# Patient Record
Sex: MALE | Race: WHITE | NOT HISPANIC OR LATINO | Employment: FULL TIME | URBAN - NONMETROPOLITAN AREA
[De-identification: names, ages, dates, MRNs, and addresses within clinical notes are randomized per-mention and may not be internally consistent; named-entity substitution may affect disease eponyms.]

---

## 2017-09-20 DIAGNOSIS — I10 ESSENTIAL HYPERTENSION: ICD-10-CM

## 2017-09-21 RX ORDER — SPIRONOLACTONE 50 MG/1
TABLET, FILM COATED ORAL
Qty: 90 TAB | Refills: 0 | Status: SHIPPED | OUTPATIENT
Start: 2017-09-21 | End: 2017-12-19 | Stop reason: SDUPTHER

## 2017-09-21 RX ORDER — LISINOPRIL 20 MG/1
TABLET ORAL
Qty: 90 TAB | Refills: 0 | Status: SHIPPED | OUTPATIENT
Start: 2017-09-21 | End: 2017-12-19 | Stop reason: SDUPTHER

## 2017-12-19 DIAGNOSIS — I10 ESSENTIAL HYPERTENSION: ICD-10-CM

## 2017-12-19 RX ORDER — SPIRONOLACTONE 50 MG/1
TABLET, FILM COATED ORAL
Qty: 90 TAB | Refills: 3 | Status: SHIPPED | OUTPATIENT
Start: 2017-12-19 | End: 2018-07-16 | Stop reason: SDUPTHER

## 2017-12-19 RX ORDER — LISINOPRIL 20 MG/1
TABLET ORAL
Qty: 90 TAB | Refills: 3 | Status: SHIPPED | OUTPATIENT
Start: 2017-12-19 | End: 2018-07-16 | Stop reason: SDUPTHER

## 2018-07-09 ENCOUNTER — HOSPITAL ENCOUNTER (OUTPATIENT)
Dept: LAB | Facility: MEDICAL CENTER | Age: 63
End: 2018-07-09
Attending: FAMILY MEDICINE
Payer: COMMERCIAL

## 2018-07-09 DIAGNOSIS — E55.9 VITAMIN D DEFICIENCY DISEASE: ICD-10-CM

## 2018-07-09 DIAGNOSIS — E78.2 MIXED HYPERLIPIDEMIA: ICD-10-CM

## 2018-07-09 LAB
25(OH)D3 SERPL-MCNC: 55 NG/ML (ref 30–100)
BASOPHILS # BLD AUTO: 0.5 % (ref 0–1.8)
BASOPHILS # BLD: 0.05 K/UL (ref 0–0.12)
EOSINOPHIL # BLD AUTO: 0.26 K/UL (ref 0–0.51)
EOSINOPHIL NFR BLD: 2.7 % (ref 0–6.9)
ERYTHROCYTE [DISTWIDTH] IN BLOOD BY AUTOMATED COUNT: 43.9 FL (ref 35.9–50)
HCT VFR BLD AUTO: 50.1 % (ref 42–52)
HGB BLD-MCNC: 16.7 G/DL (ref 14–18)
IMM GRANULOCYTES # BLD AUTO: 0.04 K/UL (ref 0–0.11)
IMM GRANULOCYTES NFR BLD AUTO: 0.4 % (ref 0–0.9)
LYMPHOCYTES # BLD AUTO: 3.01 K/UL (ref 1–4.8)
LYMPHOCYTES NFR BLD: 31.4 % (ref 22–41)
MCH RBC QN AUTO: 32.5 PG (ref 27–33)
MCHC RBC AUTO-ENTMCNC: 33.3 G/DL (ref 33.7–35.3)
MCV RBC AUTO: 97.5 FL (ref 81.4–97.8)
MONOCYTES # BLD AUTO: 0.75 K/UL (ref 0–0.85)
MONOCYTES NFR BLD AUTO: 7.8 % (ref 0–13.4)
NEUTROPHILS # BLD AUTO: 5.47 K/UL (ref 1.82–7.42)
NEUTROPHILS NFR BLD: 57.2 % (ref 44–72)
NRBC # BLD AUTO: 0 K/UL
NRBC BLD-RTO: 0 /100 WBC
PLATELET # BLD AUTO: 294 K/UL (ref 164–446)
PMV BLD AUTO: 11.3 FL (ref 9–12.9)
RBC # BLD AUTO: 5.14 M/UL (ref 4.7–6.1)
WBC # BLD AUTO: 9.6 K/UL (ref 4.8–10.8)

## 2018-07-09 PROCEDURE — 82306 VITAMIN D 25 HYDROXY: CPT

## 2018-07-09 PROCEDURE — 36415 COLL VENOUS BLD VENIPUNCTURE: CPT

## 2018-07-09 PROCEDURE — 80061 LIPID PANEL: CPT

## 2018-07-09 PROCEDURE — 85025 COMPLETE CBC W/AUTO DIFF WBC: CPT

## 2018-07-09 PROCEDURE — 80053 COMPREHEN METABOLIC PANEL: CPT

## 2018-07-10 LAB
ALBUMIN SERPL BCP-MCNC: 4.1 G/DL (ref 3.2–4.9)
ALBUMIN/GLOB SERPL: 1.7 G/DL
ALP SERPL-CCNC: 67 U/L (ref 30–99)
ALT SERPL-CCNC: 21 U/L (ref 2–50)
ANION GAP SERPL CALC-SCNC: 6 MMOL/L (ref 0–11.9)
AST SERPL-CCNC: 18 U/L (ref 12–45)
BILIRUB SERPL-MCNC: 0.5 MG/DL (ref 0.1–1.5)
BUN SERPL-MCNC: 13 MG/DL (ref 8–22)
CALCIUM SERPL-MCNC: 9.5 MG/DL (ref 8.5–10.5)
CHLORIDE SERPL-SCNC: 107 MMOL/L (ref 96–112)
CHOLEST SERPL-MCNC: 203 MG/DL (ref 100–199)
CO2 SERPL-SCNC: 27 MMOL/L (ref 20–33)
CREAT SERPL-MCNC: 1.06 MG/DL (ref 0.5–1.4)
GLOBULIN SER CALC-MCNC: 2.4 G/DL (ref 1.9–3.5)
GLUCOSE SERPL-MCNC: 95 MG/DL (ref 65–99)
HDLC SERPL-MCNC: 50 MG/DL
LDLC SERPL CALC-MCNC: 102 MG/DL
POTASSIUM SERPL-SCNC: 4.4 MMOL/L (ref 3.6–5.5)
PROT SERPL-MCNC: 6.5 G/DL (ref 6–8.2)
SODIUM SERPL-SCNC: 140 MMOL/L (ref 135–145)
TRIGL SERPL-MCNC: 254 MG/DL (ref 0–149)

## 2018-07-16 ENCOUNTER — OFFICE VISIT (OUTPATIENT)
Dept: MEDICAL GROUP | Facility: MEDICAL CENTER | Age: 63
End: 2018-07-16
Payer: COMMERCIAL

## 2018-07-16 VITALS
WEIGHT: 195 LBS | HEIGHT: 70 IN | OXYGEN SATURATION: 92 % | HEART RATE: 91 BPM | TEMPERATURE: 98.3 F | BODY MASS INDEX: 27.92 KG/M2 | DIASTOLIC BLOOD PRESSURE: 82 MMHG | SYSTOLIC BLOOD PRESSURE: 118 MMHG

## 2018-07-16 DIAGNOSIS — M15.9 PRIMARY OSTEOARTHRITIS INVOLVING MULTIPLE JOINTS: ICD-10-CM

## 2018-07-16 DIAGNOSIS — F17.200 TOBACCO USE DISORDER: ICD-10-CM

## 2018-07-16 DIAGNOSIS — J30.1 SEASONAL ALLERGIC RHINITIS DUE TO POLLEN: ICD-10-CM

## 2018-07-16 DIAGNOSIS — L50.2 URTICARIA DUE TO COLD: ICD-10-CM

## 2018-07-16 DIAGNOSIS — E78.2 MIXED HYPERLIPIDEMIA: ICD-10-CM

## 2018-07-16 DIAGNOSIS — E55.9 VITAMIN D DEFICIENCY DISEASE: ICD-10-CM

## 2018-07-16 DIAGNOSIS — C67.4 MALIGNANT NEOPLASM OF POSTERIOR WALL OF URINARY BLADDER (HCC): ICD-10-CM

## 2018-07-16 DIAGNOSIS — I10 ESSENTIAL HYPERTENSION: ICD-10-CM

## 2018-07-16 PROCEDURE — 99214 OFFICE O/P EST MOD 30 MIN: CPT | Performed by: FAMILY MEDICINE

## 2018-07-16 RX ORDER — CETIRIZINE HYDROCHLORIDE 10 MG/1
10 TABLET ORAL DAILY
COMMUNITY

## 2018-07-16 RX ORDER — LISINOPRIL 20 MG/1
20 TABLET ORAL
Qty: 90 TAB | Refills: 3 | Status: SHIPPED | OUTPATIENT
Start: 2018-07-16 | End: 2019-11-18 | Stop reason: SDUPTHER

## 2018-07-16 RX ORDER — SPIRONOLACTONE 50 MG/1
50 TABLET, FILM COATED ORAL
Qty: 90 TAB | Refills: 3 | Status: SHIPPED | OUTPATIENT
Start: 2018-07-16 | End: 2019-11-18 | Stop reason: SDUPTHER

## 2018-07-16 RX ORDER — ALBUTEROL SULFATE 90 UG/1
2 AEROSOL, METERED RESPIRATORY (INHALATION) EVERY 6 HOURS PRN
Qty: 3 INHALER | Refills: 3 | Status: SHIPPED | OUTPATIENT
Start: 2018-07-16 | End: 2018-07-18 | Stop reason: CLARIF

## 2018-07-16 ASSESSMENT — PATIENT HEALTH QUESTIONNAIRE - PHQ9: CLINICAL INTERPRETATION OF PHQ2 SCORE: 0

## 2018-07-16 NOTE — ASSESSMENT & PLAN NOTE
This is a chronic health problem for this patient that he is trying to quit multiple times.  He works as a  and finds that he is unable to quit currently.  He will have to do this for 3 more years.  Then I will hit him hard to get him to quit.

## 2018-07-16 NOTE — PROGRESS NOTES
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      HISTORY OF PRESENT ILLNESS: Patient is a 62 y.o. male established patient who presents today to follow-up on chronic health problems as outlined below.  Patient informs me that he has 3 years and he will be able to retire from .  He is moving from nights to days in the coming 3 weeks.  This will work better for his overall health issues.    Health Maintenance: Patient refuses lung cancer risk screening and will not do fit testing until colonoscopy is due.    Essential hypertension  This is a chronic health problem for this patient that is adequately controlled with current medications.  His blood pressures down to 118/82.  He is maintaining his weight and doing well.    Primary osteoarthritis involving multiple joints  This is a chronic health problem for this patient which is not perfectly controlled.  He is using diclofenac sodium on a daily basis to try and keep him mobile but this does not completely control his pain.  He works as a  and has to be seated for extended periods of time.  When he goes to get out of the tract he is very stiff and it will take up to 40 minutes before he can move normally.    Tobacco use disorder  This is a chronic health problem for this patient that he is trying to quit multiple times.  He works as a  and finds that he is unable to quit currently.  He will have to do this for 3 more years.  Then I will hit him hard to get him to quit.    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  He had a much lesser case this last year.   We will keep an eye out for this in the future.    Mixed hyperlipidemia  This is a chronic health problem for this patient that has shown some progression.  His total cholesterol is 203, triglycerides went up to 254 and we want those to be under 150, HDL good at 50 LDL okay at 102 but would like to see it under 100.  Patient and his wife have both recently gone to some lifestyle changes and change to how they were eating.  He does drink alcohol typically 5-6 beers when he gets home.  He is now cut that down to 2-3 and is going to try and stay with just 2 beers.    Malignant neoplasm of posterior wall of urinary bladder  This is a chronic health problem for this patient for which he follows with Dr. Tobin Arzate.  He so far is in remission.  He has 1 more additional evaluation which will be coming up in September 2018.    Vitamin D deficiency disease  This is a chronic health problem for which he is on 10,000 units daily he is doing well.  We will have him continue that level of dosage because his vitamin D level is excellent at 55.      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    Current Outpatient Prescriptions   Medication Sig Dispense Refill   • cetirizine (ZYRTEC) 10 MG Tab Take 10 mg by mouth every day.     • diclofenac CR (VOLTAREN-XR) 100 MG TABLET SR 24 HR tablet Take 1 Tab by mouth every day. 90 Tab 3   • lisinopril (PRINIVIL) 20 MG Tab Take 1 Tab by mouth every day. 90 Tab 3   •  spironolactone (ALDACTONE) 50 MG Tab Take 1 Tab by mouth every day. 90 Tab 3   • albuterol (VENTOLIN HFA) 108 (90 Base) MCG/ACT Aero Soln inhalation aerosol Inhale 2 Puffs by mouth every 6 hours as needed. 3 Inhaler 3   • Cholecalciferol (VITAMIN D3) 5000 UNIT CAPS Take 10,000 Units by mouth every day. 60 Cap 11   • pseudoephedrine SR (SUDAFED 12 HOUR) 120 MG TB12 Take 120 mg by mouth every day.     • triamcinolone acetonide (KENALOG) 0.1 % OINT Apply to affected area bid as needed 90 g 3     No current facility-administered medications for this visit.        Social History   Substance Use Topics   • Smoking status: Current Every Day Smoker     Packs/day: 2.00     Years: 40.00     Types: Cigarettes   • Smokeless tobacco: Never Used   • Alcohol use 10.5 oz/week     21 Cans of beer per week     Social History     Social History Narrative   • No narrative on file       Family History   Problem Relation Age of Onset   • Cancer Mother      breast   • Cancer Father      lung       Review of Systems:      - Constitutional: Negative for fever, chills, unexpected weight change, and fatigue/generalized weakness.     - HEENT: Negative for headaches, vision changes, hearing changes, ear pain, ear discharge, rhinorrhea, sinus congestion, sore throat, and neck pain.      - Respiratory: Negative for cough, sputum production, chest congestion, dyspnea, wheezing, and crackles.      - Cardiovascular: Negative for chest pain, palpitations, orthopnea, and bilateral lower extremity edema.     - Gastrointestinal: Negative for heartburn, nausea, vomiting, abdominal pain, hematochezia, melena, diarrhea, constipation, and greasy/foul-smelling stools.     - Genitourinary: Negative for dysuria, polyuria, hematuria, pyuria, urinary urgency, and urinary incontinence.    - Musculoskeletal: Patient lives with joint pain in nearly every joint but particularly bad and low back hips knees and hands bilaterally.  Otherwise denies myalgias, back pain,  "and joint pain.     - Skin: Negative for rash, itching, cyanotic skin color change.     - Neurological: Negative for dizziness, tingling, tremors, focal sensory deficit, focal weakness and headaches.     - Endo/Heme/Allergies: Does not bruise/bleed easily.     - Psychiatric/Behavioral: Negative for depression, suicidal/homicidal ideation and memory loss.          - NOTE: All other systems reviewed and are negative, except as in HPI.    Exam:    Blood pressure 118/82, pulse 91, temperature 36.8 °C (98.3 °F), height 1.778 m (5' 10\"), weight 88.5 kg (195 lb), SpO2 92 %. Body mass index is 27.98 kg/m².    General:  Well nourished, well developed male in NAD  LABS: 7/9/18: Results reviewed and discussed with the patient, questions answered.    Patient was seen for 25 minutes face to face of which, 20 minutes was spent counseling regarding the above mentioned problems.  Assessment/Plan:  1. Essential hypertension  Controlled, continue with current meds and lifestyle.    - lisinopril (PRINIVIL) 20 MG Tab; Take 1 Tab by mouth every day.  Dispense: 90 Tab; Refill: 3  - spironolactone (ALDACTONE) 50 MG Tab; Take 1 Tab by mouth every day.  Dispense: 90 Tab; Refill: 3    2. Primary osteoarthritis involving multiple joints  Controlled, continue with current meds and lifestyle.      3. Tobacco use disorder  Patient cautioned to quit smoking.  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    4. 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      5. Mixed hyperlipidemia  Uncontrolled because patient's triglycerides have gone up.  I think it is due to alcohol use.  He is going to cut down to 2 beers a day and we will plan to see him back in 3 months to be certain that his triglycerides have come back down.  - LIPID PROFILE; Future  - COMP METABOLIC PANEL; Future    6. Malignant neoplasm of posterior wall of urinary bladder (HCC)  Stable, patient has 1 more follow-up with his urologist " before he will be considered a cure.    7. Vitamin D deficiency disease  Controlled, continue with current meds and lifestyle.      8. Seasonal allergic rhinitis due to pollen  Patient utilizes Ventolin when his inhaler is needed because of his allergies getting out of control.  He also is helping some of the shortness of breath he gets from smoking.  - albuterol (VENTOLIN HFA) 108 (90 Base) MCG/ACT Aero Soln inhalation aerosol; Inhale 2 Puffs by mouth every 6 hours as needed.  Dispense: 3 Inhaler; Refill: 3

## 2018-07-16 NOTE — ASSESSMENT & PLAN NOTE
This is a chronic health problem for this patient for which he follows with Dr. Tobin Arzate.  He so far is in remission.  He has 1 more additional evaluation which will be coming up in September 2018.

## 2018-07-16 NOTE — ASSESSMENT & PLAN NOTE
This is a chronic health problem for this patient that he is able to control with use of the cream when the rash shows up.  He had a much lesser case this last year.  We will keep an eye out for this in the future.

## 2018-07-16 NOTE — ASSESSMENT & PLAN NOTE
This is a chronic health problem for this patient that has shown some progression.  His total cholesterol is 203, triglycerides went up to 254 and we want those to be under 150, HDL good at 50 LDL okay at 102 but would like to see it under 100.  Patient and his wife have both recently gone to some lifestyle changes and change to how they were eating.  He does drink alcohol typically 5-6 beers when he gets home.  He is now cut that down to 2-3 and is going to try and stay with just 2 beers.

## 2018-07-16 NOTE — ASSESSMENT & PLAN NOTE
This is a chronic health problem for this patient that is adequately controlled with current medications.  His blood pressures down to 118/82.  He is maintaining his weight and doing well.

## 2018-07-18 RX ORDER — ALBUTEROL SULFATE 90 UG/1
2 AEROSOL, METERED RESPIRATORY (INHALATION) EVERY 6 HOURS PRN
Qty: 3 INHALER | Refills: 3 | Status: SHIPPED | OUTPATIENT
Start: 2018-07-18 | End: 2019-11-18 | Stop reason: SDUPTHER

## 2018-09-08 ENCOUNTER — HOSPITAL ENCOUNTER (OUTPATIENT)
Dept: LAB | Facility: MEDICAL CENTER | Age: 63
End: 2018-09-08
Attending: UROLOGY
Payer: COMMERCIAL

## 2018-09-08 LAB — PSA SERPL-MCNC: 0.41 NG/ML (ref 0–4)

## 2018-09-08 PROCEDURE — 36415 COLL VENOUS BLD VENIPUNCTURE: CPT

## 2018-09-08 PROCEDURE — 84153 ASSAY OF PSA TOTAL: CPT

## 2019-09-09 ENCOUNTER — HOSPITAL ENCOUNTER (OUTPATIENT)
Dept: LAB | Facility: MEDICAL CENTER | Age: 64
End: 2019-09-09
Attending: UROLOGY
Payer: COMMERCIAL

## 2019-09-09 LAB — PSA SERPL-MCNC: 0.53 NG/ML (ref 0–4)

## 2019-09-09 PROCEDURE — 84153 ASSAY OF PSA TOTAL: CPT

## 2019-09-09 PROCEDURE — 36415 COLL VENOUS BLD VENIPUNCTURE: CPT

## 2019-11-15 DIAGNOSIS — E78.2 MIXED HYPERLIPIDEMIA: ICD-10-CM

## 2019-11-15 DIAGNOSIS — E55.9 VITAMIN D DEFICIENCY DISEASE: ICD-10-CM

## 2019-11-16 ENCOUNTER — HOSPITAL ENCOUNTER (OUTPATIENT)
Dept: LAB | Facility: MEDICAL CENTER | Age: 64
End: 2019-11-16
Attending: FAMILY MEDICINE
Payer: MEDICARE

## 2019-11-16 DIAGNOSIS — E55.9 VITAMIN D DEFICIENCY DISEASE: ICD-10-CM

## 2019-11-16 DIAGNOSIS — E78.2 MIXED HYPERLIPIDEMIA: ICD-10-CM

## 2019-11-16 LAB
25(OH)D3 SERPL-MCNC: 51 NG/ML (ref 30–100)
ALBUMIN SERPL BCP-MCNC: 4.2 G/DL (ref 3.2–4.9)
ALBUMIN/GLOB SERPL: 1.8 G/DL
ALP SERPL-CCNC: 63 U/L (ref 30–99)
ALT SERPL-CCNC: 22 U/L (ref 2–50)
ANION GAP SERPL CALC-SCNC: 8 MMOL/L (ref 0–11.9)
AST SERPL-CCNC: 18 U/L (ref 12–45)
BASOPHILS # BLD AUTO: 0.6 % (ref 0–1.8)
BASOPHILS # BLD: 0.06 K/UL (ref 0–0.12)
BILIRUB SERPL-MCNC: 0.7 MG/DL (ref 0.1–1.5)
BUN SERPL-MCNC: 15 MG/DL (ref 8–22)
CALCIUM SERPL-MCNC: 9.7 MG/DL (ref 8.5–10.5)
CHLORIDE SERPL-SCNC: 104 MMOL/L (ref 96–112)
CHOLEST SERPL-MCNC: 221 MG/DL (ref 100–199)
CO2 SERPL-SCNC: 27 MMOL/L (ref 20–33)
CREAT SERPL-MCNC: 1.21 MG/DL (ref 0.5–1.4)
EOSINOPHIL # BLD AUTO: 0.54 K/UL (ref 0–0.51)
EOSINOPHIL NFR BLD: 5.2 % (ref 0–6.9)
ERYTHROCYTE [DISTWIDTH] IN BLOOD BY AUTOMATED COUNT: 46.1 FL (ref 35.9–50)
FASTING STATUS PATIENT QL REPORTED: NORMAL
GLOBULIN SER CALC-MCNC: 2.3 G/DL (ref 1.9–3.5)
GLUCOSE SERPL-MCNC: 93 MG/DL (ref 65–99)
HCT VFR BLD AUTO: 54.7 % (ref 42–52)
HDLC SERPL-MCNC: 46 MG/DL
HGB BLD-MCNC: 18.2 G/DL (ref 14–18)
IMM GRANULOCYTES # BLD AUTO: 0.04 K/UL (ref 0–0.11)
IMM GRANULOCYTES NFR BLD AUTO: 0.4 % (ref 0–0.9)
LDLC SERPL CALC-MCNC: 127 MG/DL
LYMPHOCYTES # BLD AUTO: 2.69 K/UL (ref 1–4.8)
LYMPHOCYTES NFR BLD: 26.1 % (ref 22–41)
MCH RBC QN AUTO: 33.3 PG (ref 27–33)
MCHC RBC AUTO-ENTMCNC: 33.3 G/DL (ref 33.7–35.3)
MCV RBC AUTO: 100.2 FL (ref 81.4–97.8)
MONOCYTES # BLD AUTO: 0.96 K/UL (ref 0–0.85)
MONOCYTES NFR BLD AUTO: 9.3 % (ref 0–13.4)
NEUTROPHILS # BLD AUTO: 6.02 K/UL (ref 1.82–7.42)
NEUTROPHILS NFR BLD: 58.4 % (ref 44–72)
NRBC # BLD AUTO: 0 K/UL
NRBC BLD-RTO: 0 /100 WBC
PLATELET # BLD AUTO: 262 K/UL (ref 164–446)
PMV BLD AUTO: 11.1 FL (ref 9–12.9)
POTASSIUM SERPL-SCNC: 4.7 MMOL/L (ref 3.6–5.5)
PROT SERPL-MCNC: 6.5 G/DL (ref 6–8.2)
RBC # BLD AUTO: 5.46 M/UL (ref 4.7–6.1)
SODIUM SERPL-SCNC: 139 MMOL/L (ref 135–145)
TRIGL SERPL-MCNC: 240 MG/DL (ref 0–149)
WBC # BLD AUTO: 10.3 K/UL (ref 4.8–10.8)

## 2019-11-16 PROCEDURE — 36415 COLL VENOUS BLD VENIPUNCTURE: CPT

## 2019-11-16 PROCEDURE — 80061 LIPID PANEL: CPT

## 2019-11-16 PROCEDURE — 80053 COMPREHEN METABOLIC PANEL: CPT

## 2019-11-16 PROCEDURE — 85025 COMPLETE CBC W/AUTO DIFF WBC: CPT

## 2019-11-16 PROCEDURE — 82306 VITAMIN D 25 HYDROXY: CPT

## 2019-11-18 ENCOUNTER — OFFICE VISIT (OUTPATIENT)
Dept: MEDICAL GROUP | Facility: MEDICAL CENTER | Age: 64
End: 2019-11-18
Payer: COMMERCIAL

## 2019-11-18 VITALS
TEMPERATURE: 98.2 F | BODY MASS INDEX: 28.56 KG/M2 | RESPIRATION RATE: 12 BRPM | HEIGHT: 71 IN | OXYGEN SATURATION: 93 % | WEIGHT: 204 LBS | DIASTOLIC BLOOD PRESSURE: 72 MMHG | HEART RATE: 82 BPM | SYSTOLIC BLOOD PRESSURE: 116 MMHG

## 2019-11-18 DIAGNOSIS — C67.4 MALIGNANT NEOPLASM OF POSTERIOR WALL OF URINARY BLADDER (HCC): ICD-10-CM

## 2019-11-18 DIAGNOSIS — E55.9 VITAMIN D DEFICIENCY DISEASE: ICD-10-CM

## 2019-11-18 DIAGNOSIS — Z11.59 NEED FOR HEPATITIS C SCREENING TEST: ICD-10-CM

## 2019-11-18 DIAGNOSIS — E78.2 MIXED HYPERLIPIDEMIA: ICD-10-CM

## 2019-11-18 DIAGNOSIS — I10 ESSENTIAL HYPERTENSION: ICD-10-CM

## 2019-11-18 DIAGNOSIS — L20.84 INTRINSIC ECZEMA: ICD-10-CM

## 2019-11-18 DIAGNOSIS — Z12.12 SCREENING FOR COLORECTAL CANCER: ICD-10-CM

## 2019-11-18 DIAGNOSIS — D75.1 POLYCYTHEMIA SECONDARY TO SMOKING: ICD-10-CM

## 2019-11-18 DIAGNOSIS — Z12.11 SCREENING FOR COLORECTAL CANCER: ICD-10-CM

## 2019-11-18 DIAGNOSIS — F17.200 TOBACCO USE DISORDER: ICD-10-CM

## 2019-11-18 PROCEDURE — 99214 OFFICE O/P EST MOD 30 MIN: CPT | Performed by: FAMILY MEDICINE

## 2019-11-18 RX ORDER — ALBUTEROL SULFATE 90 UG/1
2 AEROSOL, METERED RESPIRATORY (INHALATION) EVERY 6 HOURS PRN
Qty: 3 INHALER | Refills: 3 | Status: SHIPPED | OUTPATIENT
Start: 2019-11-18

## 2019-11-18 RX ORDER — SPIRONOLACTONE 50 MG/1
50 TABLET, FILM COATED ORAL
Qty: 90 TAB | Refills: 3 | Status: SHIPPED | OUTPATIENT
Start: 2019-11-18

## 2019-11-18 RX ORDER — LISINOPRIL 20 MG/1
20 TABLET ORAL
Qty: 90 TAB | Refills: 3 | Status: SHIPPED | OUTPATIENT
Start: 2019-11-18 | End: 2020-08-26 | Stop reason: SDUPTHER

## 2019-11-18 RX ORDER — TRIAMCINOLONE ACETONIDE 1 MG/G
OINTMENT TOPICAL
Qty: 90 G | Refills: 3 | Status: SHIPPED | OUTPATIENT
Start: 2019-11-18 | End: 2020-04-06

## 2019-11-18 ASSESSMENT — PATIENT HEALTH QUESTIONNAIRE - PHQ9: CLINICAL INTERPRETATION OF PHQ2 SCORE: 0

## 2019-11-18 NOTE — ASSESSMENT & PLAN NOTE
Pt is now 4 years without cancer return.  He continues with annual evaluations and will be done in the coming year.

## 2019-11-18 NOTE — ASSESSMENT & PLAN NOTE
This is a chronic this is a chronic health problem in a gentleman who smokes.  He tells me he will never stop smoking.  His hemoglobin has gone up in July of last year it was 16.7 now at 18.2.  He is a  and drives long haul.  I am going to have him start taking a baby aspirin 81 mg daily to try and prevent blood clots.

## 2019-11-18 NOTE — ASSESSMENT & PLAN NOTE
This chronic health problem for this patient well-controlled with current meds blood pressure is excellent 116/72.  Weight is stable.

## 2019-11-18 NOTE — ASSESSMENT & PLAN NOTE
This is continuing to be a problem.  He is not interested in stopping at this time or in the future.

## 2019-11-18 NOTE — ASSESSMENT & PLAN NOTE
This is a chronic health problem that is uncontrolled with current medications and lifestyle measures.  He refuses to take a statin.  His total cholesterol is 221, triglycerides of come down to 240 they were 254 previously.  His HDL is decent at 46 LDL only slightly high at 127.  I did look to see it under 100.  Patient will continue with lifestyle management.

## 2019-11-18 NOTE — PROGRESS NOTES
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    HISTORY OF PRESENT ILLNESS: Patient is a 63 y.o. male established patient who presents today to talk about his chronic health problems listed below.  Patient also has a new problem which is the fact that he is developed polycythemia.  Patient is a smoker and has no desire to quit smoking.  We may need to start doing therapeutic phlebotomies on him.  We will wait and recheck in 3 months to see whether or not he goes much higher on his hemoglobin.  Patient also informs me that he is now using multiple herbal products to try and help his chronic health problems and feels he is doing fairly well.  He refuses all vaccines secondary to mercury.      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  He tells me he will never stop smoking.  His hemoglobin has gone up in July of last year it was 16.7 now at 18.2.  He is a  and drives long haul.  I am going to have him start taking a baby aspirin 81 mg daily to try and prevent blood clots.    Essential hypertension  This chronic health problem for this patient well-controlled with current meds blood pressure is excellent 116/72.  Weight is stable.    Mixed hyperlipidemia  This is a chronic health problem that is uncontrolled with current medications and lifestyle measures.  He refuses to take a statin.  His total cholesterol is 221, triglycerides of come down to 240 they were 254 previously.  His HDL is decent at 46 LDL only slightly high at 127.  I did look to see it under 100.  Patient will continue with lifestyle management.    Tobacco use disorder  This is continuing  to be a problem.  He is not interested in stopping at this time or in the future.    Vitamin D deficiency disease  This is a chronic health problem that is well controlled with current medications and lifestyle measures.  His Vitamin D is excellent at 55.    Malignant neoplasm of posterior wall of urinary bladder  Pt is now 4 years without cancer return.  He continues with annual evaluations and will be done in the coming year.      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    Current Outpatient Medications   Medication Sig Dispense Refill   • albuterol 108 (90 Base) MCG/ACT Aero Soln inhalation aerosol Inhale 2 Puffs by mouth every 6 hours as needed for Shortness of Breath. 3 Inhaler 3   • lisinopril (PRINIVIL) 20 MG Tab Take 1 Tab by mouth every day. 90 Tab 3   • spironolactone (ALDACTONE) 50 MG Tab Take 1 Tab by mouth every day. 90 Tab 3   • triamcinolone acetonide (KENALOG) 0.1 % Ointment Apply to affected area bid as needed 90 g 3   • cetirizine (ZYRTEC) 10 MG Tab Take 10 mg by mouth every day.     • diclofenac CR (VOLTAREN-XR) 100 MG TABLET SR 24 HR tablet Take 1 Tab by mouth every day. 90 Tab 3   • pseudoephedrine SR (SUDAFED 12 HOUR) 120 MG TB12 Take 120 mg by mouth every day.     • Cholecalciferol (VITAMIN D3) 5000 UNIT CAPS Take 10,000 Units by mouth every day. 60 Cap 11     No current facility-administered medications for this  visit.        Social History     Tobacco Use   • Smoking status: Current Every Day Smoker     Packs/day: 2.00     Years: 40.00     Pack years: 80.00     Types: Cigarettes   • Smokeless tobacco: Never Used   Substance Use Topics   • Alcohol use: Yes     Alcohol/week: 10.5 oz     Types: 21 Cans of beer per week   • Drug use: No     Social History     Patient does not qualify to have social determinant information on file (likely too young).   Social History Narrative   • Not on file       Family History   Problem Relation Age of Onset   • Cancer Mother         breast   • Cancer Father         lung        ROS:     - Constitutional:  Negative for fever, chills, unexpected weight change, and fatigue/generalized weakness.    - HEENT:  Negative for headaches, vision changes, hearing changes, ear pain, ear discharge, rhinorrhea, sinus congestion, sore throat, and neck pain.      - Respiratory: Negative for cough, sputum production, chest congestion, dyspnea, wheezing, and crackles.      - Cardiovascular: Negative for chest pain, palpitations, orthopnea, and bilateral lower extremity edema.     - Gastrointestinal: Negative for heartburn, nausea, vomiting, abdominal pain, hematochezia, melena, diarrhea, constipation, and greasy/foul-smelling stools.     - Genitourinary: Negative for dysuria, polyuria, hematuria, pyuria, urinary urgency, and urinary incontinence.     - Musculoskeletal: Patient has generalized joint pain but he can keep it under fairly good control utilizing turmeric.     - Skin: Negative for rash, itching, cyanotic skin color change.     - Neurological: Negative for dizziness, tingling, tremors, focal sensory deficit, focal weakness and headaches.     - Endo/Heme/Allergies: Does not bruise/bleed easily.     - Psychiatric/Behavioral: Negative for depression, suicidal/homicidal ideation and memory loss.          - NOTE: All other systems reviewed and are negative, except as in HPI.      Exam:    /72 (BP  "Location: Left arm, Patient Position: Sitting, BP Cuff Size: Adult)   Pulse 82   Temp 36.8 °C (98.2 °F) (Temporal)   Resp 12   Ht 1.803 m (5' 11\")   Wt 92.5 kg (204 lb)   SpO2 93%  Body mass index is 28.45 kg/m².    General:  Well nourished, well developed male in NAD  Head is grossly normal.  Neck: Supple without JVD or bruit. Thyroid is not enlarged.  Pulmonary: Clear to ausculation and percussion.  Normal effort. No rales, ronchi, or wheezing.  Cardiovascular: Regular rate and rhythm without murmur. Carotid and radial pulses are intact and equal bilaterally.  Extremities: no clubbing, cyanosis, or edema.  LABS: 11/16/2019: Results reviewed and discussed with the patient, questions answered.    Please note that this dictation was created using voice recognition software. I have made every reasonable attempt to correct obvious errors, but I expect that there are errors of grammar and possibly content that I did not discover before finalizing the note.    Assessment/Plan:  1. Need for hepatitis C screening test  Patient will do hepatitis C screening  - HEP C VIRUS ANTIBODY; Future    2. Screening for colorectal cancer  Patient will do FIT testing  - Occult Blood Feces Immunoassay (FIT); Future    3. 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  He is going to recheck in 3 months because he absolutely refuses to quit smoking.    4. Essential hypertension  Controlled, continue with current meds and lifestyle.    - lisinopril (PRINIVIL) 20 MG Tab; Take 1 Tab by mouth every day.  Dispense: 90 Tab; Refill: 3  - spironolactone (ALDACTONE) 50 MG Tab; Take 1 Tab by mouth every day.  Dispense: 90 Tab; Refill: 3    5. Mixed hyperlipidemia  Uncontrolled and patient refuses statin.    6. Tobacco use disorder  Refuses to quit smoking he realizes that shortening his life  7. Vitamin D deficiency disease  Well-controlled with current dose    8. Malignant neoplasm of " posterior wall of urinary bladder (HCC)  Stable, patient's 4 years cancer free continuing with screenings with urology.  We will plan to recheck this in 1 year.    9. Intrinsic eczema  Controlled, continue with current meds and lifestyle.    - triamcinolone acetonide (KENALOG) 0.1 % Ointment; Apply to affected area bid as needed  Dispense: 90 g; Refill: 3

## 2019-11-18 NOTE — ASSESSMENT & PLAN NOTE
This is a chronic health problem that is well controlled with current medications and lifestyle measures.  His Vitamin D is excellent at 55.

## 2020-04-06 DIAGNOSIS — L20.84 INTRINSIC ECZEMA: ICD-10-CM

## 2020-04-06 RX ORDER — TRIAMCINOLONE ACETONIDE 1 MG/G
OINTMENT TOPICAL
Qty: 90 G | Refills: 3 | Status: SHIPPED | OUTPATIENT
Start: 2020-04-06

## 2020-05-18 ENCOUNTER — OFFICE VISIT (OUTPATIENT)
Dept: MEDICAL GROUP | Facility: PHYSICIAN GROUP | Age: 65
End: 2020-05-18
Payer: COMMERCIAL

## 2020-05-18 VITALS
DIASTOLIC BLOOD PRESSURE: 78 MMHG | HEIGHT: 71 IN | TEMPERATURE: 97.6 F | HEART RATE: 88 BPM | BODY MASS INDEX: 27.72 KG/M2 | SYSTOLIC BLOOD PRESSURE: 134 MMHG | OXYGEN SATURATION: 96 % | WEIGHT: 198 LBS | RESPIRATION RATE: 18 BRPM

## 2020-05-18 DIAGNOSIS — R19.4 BOWEL HABIT CHANGES: ICD-10-CM

## 2020-05-18 PROCEDURE — 99214 OFFICE O/P EST MOD 30 MIN: CPT | Performed by: NURSE PRACTITIONER

## 2020-05-18 RX ORDER — MULTIVIT WITH MINERALS/LUTEIN
TABLET ORAL
COMMUNITY

## 2020-05-18 SDOH — HEALTH STABILITY: MENTAL HEALTH: HOW OFTEN DO YOU HAVE A DRINK CONTAINING ALCOHOL?: 4 OR MORE TIMES A WEEK

## 2020-05-18 ASSESSMENT — FIBROSIS 4 INDEX: FIB4 SCORE: 0.95

## 2020-05-18 ASSESSMENT — PATIENT HEALTH QUESTIONNAIRE - PHQ9: CLINICAL INTERPRETATION OF PHQ2 SCORE: 0

## 2020-05-18 NOTE — PROGRESS NOTES
Chief Complaint   Patient presents with   • Irritable Bowel Syndrome     x 2 months         This is a 64 y.o.male patient that presents today with the following: diarrhea for 2 months, hx of IBS    Bowel habit changes  Pt presents today for diarrhea that has been going on for the past 2 months. Reports a past hx of IBS about 30 years ago, will have an occasional flare, mostly diarrhea, but they are short lived. Denies vomiting, nausea, blood in stool, fever, body aches or any other associated symptoms. States mostly related to food but cannot pinpoint any particular food that exacerbates his symptoms. Has not recently travelled, no new medications, no new foods, nobody else in household has similar symptoms. Does take probiotic but has not really tried anything OTC such as Imodium. Discuss treatment options for IBS, encouraged to continue on probiotic, he will start Imodium according to instructions on package. Was give educational material on IBS. He understands that if s/sx do not improve over the next week or so, may need stool studies and referral to GI.      No visits with results within 1 Month(s) from this visit.   Latest known visit with results is:   Hospital Outpatient Visit on 11/16/2019   Component Date Value   • Cholesterol,Tot 11/16/2019 221*   • Triglycerides 11/16/2019 240*   • HDL 11/16/2019 46    • LDL 11/16/2019 127*   • 25-Hydroxy   Vitamin D 25 11/16/2019 51    • WBC 11/16/2019 10.3    • RBC 11/16/2019 5.46    • Hemoglobin 11/16/2019 18.2*   • Hematocrit 11/16/2019 54.7*   • MCV 11/16/2019 100.2*   • MCH 11/16/2019 33.3*   • MCHC 11/16/2019 33.3*   • RDW 11/16/2019 46.1    • Platelet Count 11/16/2019 262    • MPV 11/16/2019 11.1    • Neutrophils-Polys 11/16/2019 58.40    • Lymphocytes 11/16/2019 26.10    • Monocytes 11/16/2019 9.30    • Eosinophils 11/16/2019 5.20    • Basophils 11/16/2019 0.60    • Immature Granulocytes 11/16/2019 0.40    • Nucleated RBC 11/16/2019 0.00    • Neutrophils  (Absolute) 11/16/2019 6.02    • Lymphs (Absolute) 11/16/2019 2.69    • Monos (Absolute) 11/16/2019 0.96*   • Eos (Absolute) 11/16/2019 0.54*   • Baso (Absolute) 11/16/2019 0.06    • Immature Granulocytes (a* 11/16/2019 0.04    • NRBC (Absolute) 11/16/2019 0.00    • Sodium 11/16/2019 139    • Potassium 11/16/2019 4.7    • Chloride 11/16/2019 104    • Co2 11/16/2019 27    • Anion Gap 11/16/2019 8.0    • Glucose 11/16/2019 93    • Bun 11/16/2019 15    • Creatinine 11/16/2019 1.21    • Calcium 11/16/2019 9.7    • AST(SGOT) 11/16/2019 18    • ALT(SGPT) 11/16/2019 22    • Alkaline Phosphatase 11/16/2019 63    • Total Bilirubin 11/16/2019 0.7    • Albumin 11/16/2019 4.2    • Total Protein 11/16/2019 6.5    • Globulin 11/16/2019 2.3    • A-G Ratio 11/16/2019 1.8    • Fasting Status 11/16/2019 Fasting    • GFR If  11/16/2019 >60    • GFR If Non  Ameri* 11/16/2019 >60          clinical course has been stable    Past Medical History:   Diagnosis Date   • Arm fracture, left     closed reduction   • Contact dermatitis and other eczema, due to unspecified cause    • GERD (gastroesophageal reflux disease) 8/3/2010   • HTN (hypertension) 8/3/2010   • Malignant neoplasm of posterior wall of urinary bladder (HCC) 9/18/2015   • Osteoarthrosis involving, or with mention of more than one site, but not specified as generalized, multiple sites    • Primary osteoarthritis involving multiple joints      ICD-10 transition   • Pulmonary nodule seen on imaging study     right middle lobe - 6 mm followed by Dr. ROGER Torrez   • Pure hypercholesterolemia    • Seasonal allergies 8/3/2010   • Tobacco use disorder    • Urinary bladder cancer (HCC)     Dr. Arzate - surgery coming 9/25/15   • Urticaria due to cold and heat        Past Surgical History:   Procedure Laterality Date   • APPENDECTOMY LAPAROSCOPIC  5/22/15    [facility] with Dr. Torrez - ruptured   • TONSILLECTOMY AND ADENOIDECTOMY         Family History   Problem  "Relation Age of Onset   • Cancer Mother         breast   • Cancer Father         lung       Patient has no known allergies.    Current Outpatient Medications Ordered in Epic   Medication Sig Dispense Refill   • Ascorbic Acid (VITAMIN C) 1000 MG Tab Take  by mouth.     • Apple Cider Vinegar 188 MG Cap Take  by mouth.     • Probiotic Product (PROBIOTIC-10 PO) Take  by mouth.     • triamcinolone acetonide (KENALOG) 0.1 % Ointment APPLY EXTERNALLY TO THE AFFECTED AREA TWICE DAILY AS NEEDED 90 g 3   • albuterol 108 (90 Base) MCG/ACT Aero Soln inhalation aerosol Inhale 2 Puffs by mouth every 6 hours as needed for Shortness of Breath. 3 Inhaler 3   • lisinopril (PRINIVIL) 20 MG Tab Take 1 Tab by mouth every day. 90 Tab 3   • spironolactone (ALDACTONE) 50 MG Tab Take 1 Tab by mouth every day. 90 Tab 3   • cetirizine (ZYRTEC) 10 MG Tab Take 10 mg by mouth every day.     • Cholecalciferol (VITAMIN D3) 5000 UNIT CAPS Take 10,000 Units by mouth every day. 60 Cap 11     No current [facility]-ordered facility-administered medications on file.        Constitutional ROS: No unexpected change in weight, No weakness, No unexplained fevers, sweats, or chills  Pulmonary ROS: No chronic cough, sputum, or hemoptysis, No shortness of breath, No recent change in breathing, Positive for smoker   Cardiovascular ROS: No chest pain, No edema, No palpitations  Gastrointestinal ROS: positive per HPI  Musculoskeletal/Extremities ROS: No clubbing, No peripheral edema, No pain, redness or swelling on the joints  Neurologic ROS: Normal development, No seizures, No weakness    Physical exam:  /78   Pulse 88   Temp 36.4 °C (97.6 °F) (Temporal)   Resp 18   Ht 1.803 m (5' 11\")   Wt 89.8 kg (198 lb)   SpO2 96%   BMI 27.62 kg/m²   General Appearance:plleasant elderly male, alert, no distress, moderately overweight, well groomed  Skin: Skin color, texture, turgor normal. No rashes or lesions.  Lungs: negative findings: normal respiratory rate and " rhythm, normal effort  Musculoskeletal: negative findings: no evidence of joint instability, no evidence of muscle atrophy, no deformities present  Neurologic: intact    Medical decision making/discussion:     Take Imodium as directed on package    Continue probiotic    Follow up in 2-3 months, sooner if needed    Ursula was seen today for irritable bowel syndrome.    Diagnoses and all orders for this visit:    Bowel habit changes        Return in 3 months (on 8/18/2020), or if symptoms worsen or fail to improve, for Follow-up.        Please note that this dictation was created using voice recognition software. I have made every reasonable attempt to correct obvious errors, but I expect that there are errors of grammar and possibly content that I did not discover before finalizing the note.

## 2020-05-18 NOTE — PATIENT INSTRUCTIONS
Take Imodium as directed on package    Continue probiotic    Follow up in 2-3 months, sooner if needed    Diet for Irritable Bowel Syndrome  When you have irritable bowel syndrome (IBS), the foods you eat and your eating habits are very important. IBS may cause various symptoms, such as abdominal pain, constipation, or diarrhea. Choosing the right foods can help ease discomfort caused by these symptoms. Work with your health care provider and dietitian to find the best eating plan to help control your symptoms.  WHAT GENERAL GUIDELINES DO I NEED TO FOLLOW?  · Keep a food diary. This will help you identify foods that cause symptoms. Write down:  ¨ What you eat and when.  ¨ What symptoms you have.  ¨ When symptoms occur in relation to your meals.  · Avoid foods that cause symptoms. Talk with your dietitian about other ways to get the same nutrients that are in these foods.  · Eat more foods that contain fiber. Take a fiber supplement if directed by your dietitian.  · Eat your meals slowly, in a relaxed setting.  · Aim to eat 5-6 small meals per day. Do not skip meals.  · Drink enough fluids to keep your urine clear or pale yellow.  · Ask your health care provider if you should take an over-the-counter probiotic during flare-ups to help restore healthy gut bacteria.  · If you have cramping or diarrhea, try making your meals low in fat and high in carbohydrates. Examples of carbohydrates are pasta, rice, whole grain breads and cereals, fruits, and vegetables.  · If dairy products cause your symptoms to flare up, try eating less of them. You might be able to handle yogurt better than other dairy products because it contains bacteria that help with digestion.  WHAT FOODS ARE NOT RECOMMENDED?  The following are some foods and drinks that may worsen your symptoms:  · Fatty foods, such as French fries.  · Milk products, such as cheese or ice cream.  · Chocolate.  · Alcohol.  · Products with caffeine, such as  coffee.  · Carbonated drinks, such as soda.  The items listed above may not be a complete list of foods and beverages to avoid. Contact your dietitian for more information.  WHAT FOODS ARE GOOD SOURCES OF FIBER?  Your health care provider or dietitian may recommend that you eat more foods that contain fiber. Fiber can help reduce constipation and other IBS symptoms. Add foods with fiber to your diet a little at a time so that your body can get used to them. Too much fiber at once might cause gas and swelling of your abdomen. The following are some foods that are good sources of fiber:  · Apples.  · Peaches.  · Pears.  · Berries.  · Figs.  · Broccoli (raw).  · Cabbage.  · Carrots.  · Raw peas.  · Kidney beans.  · Lima beans.  · Whole grain bread.  · Whole grain cereal.  FOR MORE INFORMATION   International Foundation for Functional Gastrointestinal Disorders: www.iffgd.org  National [place] of Diabetes and Digestive and Kidney Diseases: www.niddk.nih.gov/health-information/health-topics/digestive-diseases/ibs/Pages/facts.aspx     This information is not intended to replace advice given to you by your health care provider. Make sure you discuss any questions you have with your health care provider.     Document Released: 03/09/2005 Document Revised: 01/08/2016 Document Reviewed: 03/20/2015  [employer] Interactive Patient Education ©2016 [employer]

## 2020-05-19 NOTE — ASSESSMENT & PLAN NOTE
Pt presents today for diarrhea that has been going on for the past 2 months. Reports a past hx of IBS about 30 years ago, will have an occasional flare, mostly diarrhea, but they are short lived. Denies vomiting, nausea, blood in stool, fever, body aches or any other associated symptoms. States mostly related to food but cannot pinpoint any particular food that exacerbates his symptoms. Has not recently travelled, no new medications, no new foods, nobody else in household has similar symptoms. Does take probiotic but has not really tried anything OTC such as Imodium. Discuss treatment options for IBS, encouraged to continue on probiotic, he will start Imodium according to instructions on package. Was give educational material on IBS. He understands that if s/sx do not improve over the next week or so, may need stool studies and referral to GI.

## 2020-08-26 DIAGNOSIS — I10 ESSENTIAL HYPERTENSION: ICD-10-CM

## 2020-08-26 RX ORDER — LISINOPRIL 20 MG/1
20 TABLET ORAL
Qty: 90 TAB | Refills: 3 | Status: SHIPPED | OUTPATIENT
Start: 2020-08-26 | End: 2021-01-12 | Stop reason: SDUPTHER

## 2021-01-12 DIAGNOSIS — I10 ESSENTIAL HYPERTENSION: ICD-10-CM

## 2021-01-12 RX ORDER — LISINOPRIL 20 MG/1
20 TABLET ORAL
Qty: 100 TAB | Refills: 1 | Status: SHIPPED | OUTPATIENT
Start: 2021-01-12

## 2022-09-12 NOTE — ASSESSMENT & PLAN NOTE
This is a chronic health problem for which he is on 10,000 units daily he is doing well.  We will have him continue that level of dosage because his vitamin D level is excellent at 55.   Post-Care Instructions: I reviewed with the patient in detail post-care instructions. Patient is to wear sun protection. Patients may expect sunburn like redness, discomfort and scabbing.